# Patient Record
Sex: MALE | Race: WHITE
[De-identification: names, ages, dates, MRNs, and addresses within clinical notes are randomized per-mention and may not be internally consistent; named-entity substitution may affect disease eponyms.]

---

## 2018-02-21 ENCOUNTER — HOSPITAL ENCOUNTER (OUTPATIENT)
Dept: HOSPITAL 17 - HDIC | Age: 58
Setting detail: OBSERVATION
LOS: 1 days | Discharge: HOME | End: 2018-02-22
Attending: INTERNAL MEDICINE | Admitting: INTERNAL MEDICINE
Payer: COMMERCIAL

## 2018-02-21 VITALS
RESPIRATION RATE: 20 BRPM | HEART RATE: 87 BPM | OXYGEN SATURATION: 97 % | SYSTOLIC BLOOD PRESSURE: 91 MMHG | DIASTOLIC BLOOD PRESSURE: 56 MMHG

## 2018-02-21 VITALS
OXYGEN SATURATION: 97 % | DIASTOLIC BLOOD PRESSURE: 68 MMHG | SYSTOLIC BLOOD PRESSURE: 95 MMHG | HEART RATE: 91 BPM | RESPIRATION RATE: 18 BRPM

## 2018-02-21 VITALS
HEART RATE: 89 BPM | RESPIRATION RATE: 20 BRPM | TEMPERATURE: 98 F | DIASTOLIC BLOOD PRESSURE: 63 MMHG | SYSTOLIC BLOOD PRESSURE: 96 MMHG | OXYGEN SATURATION: 99 %

## 2018-02-21 VITALS — WEIGHT: 86.42 LBS | BODY MASS INDEX: 15.9 KG/M2 | HEIGHT: 62 IN

## 2018-02-21 VITALS
SYSTOLIC BLOOD PRESSURE: 96 MMHG | RESPIRATION RATE: 20 BRPM | OXYGEN SATURATION: 98 % | TEMPERATURE: 97.6 F | DIASTOLIC BLOOD PRESSURE: 63 MMHG | HEART RATE: 89 BPM

## 2018-02-21 VITALS
HEART RATE: 83 BPM | SYSTOLIC BLOOD PRESSURE: 103 MMHG | OXYGEN SATURATION: 100 % | DIASTOLIC BLOOD PRESSURE: 73 MMHG | RESPIRATION RATE: 18 BRPM | TEMPERATURE: 97.6 F

## 2018-02-21 VITALS
RESPIRATION RATE: 18 BRPM | DIASTOLIC BLOOD PRESSURE: 60 MMHG | OXYGEN SATURATION: 99 % | SYSTOLIC BLOOD PRESSURE: 99 MMHG | HEART RATE: 95 BPM

## 2018-02-21 VITALS
TEMPERATURE: 98 F | RESPIRATION RATE: 18 BRPM | HEART RATE: 90 BPM | SYSTOLIC BLOOD PRESSURE: 91 MMHG | OXYGEN SATURATION: 99 % | DIASTOLIC BLOOD PRESSURE: 60 MMHG

## 2018-02-21 VITALS
OXYGEN SATURATION: 98 % | DIASTOLIC BLOOD PRESSURE: 74 MMHG | HEART RATE: 90 BPM | SYSTOLIC BLOOD PRESSURE: 104 MMHG | RESPIRATION RATE: 18 BRPM

## 2018-02-21 VITALS — HEART RATE: 102 BPM

## 2018-02-21 VITALS — HEART RATE: 96 BPM

## 2018-02-21 VITALS — HEART RATE: 98 BPM

## 2018-02-21 VITALS
DIASTOLIC BLOOD PRESSURE: 71 MMHG | SYSTOLIC BLOOD PRESSURE: 108 MMHG | HEART RATE: 90 BPM | OXYGEN SATURATION: 99 % | RESPIRATION RATE: 20 BRPM

## 2018-02-21 VITALS — HEART RATE: 100 BPM

## 2018-02-21 VITALS — HEART RATE: 93 BPM

## 2018-02-21 DIAGNOSIS — I25.5: ICD-10-CM

## 2018-02-21 DIAGNOSIS — I25.119: Primary | ICD-10-CM

## 2018-02-21 DIAGNOSIS — R94.31: ICD-10-CM

## 2018-02-21 DIAGNOSIS — I50.9: ICD-10-CM

## 2018-02-21 DIAGNOSIS — E11.3299: ICD-10-CM

## 2018-02-21 DIAGNOSIS — E78.5: ICD-10-CM

## 2018-02-21 PROCEDURE — 99153 MOD SED SAME PHYS/QHP EA: CPT

## 2018-02-21 PROCEDURE — 99152 MOD SED SAME PHYS/QHP 5/>YRS: CPT

## 2018-02-21 PROCEDURE — 93454 CORONARY ARTERY ANGIO S&I: CPT

## 2018-02-21 PROCEDURE — 96372 THER/PROPH/DIAG INJ SC/IM: CPT

## 2018-02-21 PROCEDURE — C1876 STENT, NON-COA/NON-COV W/DEL: HCPCS

## 2018-02-21 PROCEDURE — 80048 BASIC METABOLIC PNL TOTAL CA: CPT

## 2018-02-21 PROCEDURE — C1769 GUIDE WIRE: HCPCS

## 2018-02-21 PROCEDURE — 85025 COMPLETE CBC W/AUTO DIFF WBC: CPT

## 2018-02-21 PROCEDURE — C1725 CATH, TRANSLUMIN NON-LASER: HCPCS

## 2018-02-21 PROCEDURE — G0378 HOSPITAL OBSERVATION PER HR: HCPCS

## 2018-02-21 PROCEDURE — 85002 BLEEDING TIME TEST: CPT

## 2018-02-21 PROCEDURE — 80061 LIPID PANEL: CPT

## 2018-02-21 PROCEDURE — 82948 REAGENT STRIP/BLOOD GLUCOSE: CPT

## 2018-02-21 PROCEDURE — C1887 CATHETER, GUIDING: HCPCS

## 2018-02-21 PROCEDURE — 92920 PRQ TRLUML C ANGIOP 1ART&/BR: CPT

## 2018-02-21 PROCEDURE — C1893 INTRO/SHEATH, FIXED,NON-PEEL: HCPCS

## 2018-02-21 PROCEDURE — 93005 ELECTROCARDIOGRAM TRACING: CPT

## 2018-02-21 RX ADMIN — INSULIN ASPART SCH: 100 INJECTION, SOLUTION INTRAVENOUS; SUBCUTANEOUS at 21:00

## 2018-02-21 NOTE — CATHPROC
[employer] HIS Report

Study Information

Study Number    Admission           Scheduled Start             Study Start

 

[id number]    Feb 21 2018 8:37AM      02/21/2018 Feb 21 2018 10:45AM

 

Universal Service

 

Cardiac Catheterization

 

Admit Source               Facility Department

 

Other                   [facility] - Cath Lab

 

Physician and Clinical Staff

Initial MD Martin, Dane          Circulator     Donna Scott,RN

 

                         Recorder      Yinka RN, Elisa Mora,RT(R)

 

Procedures Performed

Procedure                     Location (Site)            Vessel Name

 

Coronary Angiograms                 LCA                 Left Coronary

Coronary Angiograms                 RCA                 Right Coronary

L Heart Cath

PTCA                       LAD Prox                Left Coronary

PTCA ADD ON'S

Stent                       LAD Prox                Left Coronary

Wire insertion                   Fem Art (right)            Femoral Art

Equipment

Time            Description           Size      Mfg Part Number  Used/Scraped

                                           [id number]

11:19    [employer]   WIRE, ASAHI PROWATER 180CM   180CM               Used

                                           *[id number]

                                           [id number]

11:19    [employer]   WIRE, ASAHI PROWATER 180CM   180CM               Used

                                           *[id number]

                   TRANSDUCER, TRUWAVE               [id number]

11:05    [employer]                      *                 Used

                   W/STOCKCOCK                   *[id number]

                                           [phone]



                                           *[id number]

                                           534-621T



                                           *[id number]

                                           [phone]



                                           *[id number]

                                           [id number]

11:05    [employer]    PACK, CCL CUSTOM        *                 Used

                                           *[id number]

                                           [id number]

11:05    [employer]      PEN, SKIN DUAL W/ RULER     *                 Used

                                           *[id number]

                                           [id number]

11:31    [employer]        BALLOON, 2.0 X 12MM EUPHORA 12MM                 Used

                                           *[id number]

                                           [id number]

11:35    [employer]        STENT, 2.25 18 INTEGRITY    2.25 18              Used

                                           *[id number]

                                           [id number]

11:22    [employer]      30 IBRAHIMA INDEFLATOR                         Used

                                           *[id number]

                                           [id number]

11:05    [employer]      SHEATH, FR6.5 PRELUDE 11CM   FR 6.5     038ACT      Used

                                           *[id number]

                                           [id number]

11:05    [employer]      WIRE, 3MMJ .035 180CM      180CM               Used

                                           *[id number]

                                           [id number]

11:05    [employer]          MANIFOLD, 4 PORT        *                 Used

                                           *[id number]

11:05    [employer]         OMNIPAQUE, 350 MG, 100ML    100ML      [id number]      Used

 

11:22    [employer]         OMNIPAQUE, 350 MG, 50ML     50ML      [id number]      Used

                                           [id number]

11:05    [employer]      BLANKET,WARM AIR CCL      *                 Used

                                           *[id number]

 

Equipment Model, Serial, Lot Number and Expiration Data

Description              Model Number      Serial Number   Lot Number       Expiration Date

 

STENT, 2.25 18 INTEGRITY                            [id number]       05-

 

 

History: Current Medications

Medication         Dosage/Unit       Route      Frequency  Last Date/Time Taken

 

CARVEDILOL

 

ASA

 

Statins (any)

 

 

History: Allergies

Allergy              Reaction

 

NKDA

History: Risk Factors

                      Family History of

Hypertension   Dyslipidemia                    Previous MI    Previous Heart Failure

                      Premature CAD

No        Yes           No            No         Yes

Prior Valve

         Prior PCI        Prior CABG

Surgery

No        No            No

         Cerebrovascular     Peripheral Artery     Chronic Lung

On Dialysis                                       Diabetes   Diabetes Therapy

         Disease         Disease          Disease

No        No            No            No         Yes      Oral

 

 

History: Risk Factors Selection Items

No Previous Cardiovascular Treatment

 

 

History: Stress Tests

Stress or Imaging Studies Performed

 

No

 

 

History: Other

Current Smoker

 

No

 

Labs

Hgb (g/dl)       Hct (%)         WBC (l/cumm)        Platelets (thousands)

 

11.60-17.00      35.00-51.00       4.00-11.00         150..00

 

10.0          28.8           6.9             357

 

Glucose (mg/dl)    BUN (mg/dl)       Creatinine (mg/dl)    BUN:Creatinine (1:x)

74..00      7.00-18.00        0.50-1.30         10.00-20.00

 

254          27            1.4            19.3

 

Na (meq/l)       K (meq/l)

 

136..00     3.50-5.10

 

135          4.8

 

CPK-MB (ng/ML)

 

0.50-3.60

 

Not Drawn

 

 

 

 

Medication

Medication Total Dose (Bolus/Oral)

Medication             Total Dosage/Unit

 

1% XYLOCAINE               20 mL

 

HEPARIN                3000 units

 

PLAVIX                 600 mg

 

VERSED                  1 mg

Medications (Bolus/Oral)

Medication           Time Given          Dosage/Unit      Administered By      Reason

 

1% XYLOCAINE          2/21/2018 11:07:34 AM     20 mL         Dane Martin

20 mL 1% XYLOCAINE given by Dane Martin in Right Groin via Subcutaneous.

 

VERSED             2/21/2018 11:08:05 AM     1 mg         Donna Scott      For sedation

1 mg VERSED given in lab by Donna Scott, RN via Peripheral IV. Ordered by Dane Martin. Reason
: For sedation.

 

HEPARIN             2/21/2018 11:17:25 AM    3000 units       Donna Scott

3000 units HEPARIN given in lab by Donna Scott, RN in Left Antecubital via Peripheral IV. Ordered
 by Dane Martin.

 

PLAVIX             2/21/2018 11:48:11 AM    600 mg         Donna Scott

600 mg PLAVIX given in lab by Donna Scott RN via Oral. Ordered by Dane Martin.

 

 

Initial Case Assessment

Cardiovascular

HR             NIBP            Chest Pain

 

89             104/67           0

 

Edema Present        Skin color             Skin

 

None            Normal               Warm Dry

 

Circulatory - Right Pulses

Dorsalis Pedis       Femoral

 

2              2

 

Scale (0,1,2,3,4,d)

 

Circulatory - Left Pulses

Dorsalis Pedis       Femoral

 

2              2

 

Scale (0,1,2,3,4,d)

 

Circulatory - Lower Extremities

Color Lower Right      Color Lower Left

 

 

Normal           Normal

 

Neurological State

              Oriented to time-place-

Alert                        Moves all extremities

              person

 

Respiration - General

Respiration Rate

              SpO2 (%)          O2 (lpm)

(B/min)

15             100             0

Final Case Assessment

Cardiovascular

HR           Rhythm          NIBP          Chest Pain

 

83           sr            118/70         0

 

Edema Present      Skin color           Skin

 

None           Normal             Warm Dry

 

Circulatory - Right Pulses

Dorsalis Pedis     Femoral

 

2            2

 

Scale (0,1,2,3,4,d)

 

Circulatory - Left Pulses

Dorsalis Pedis     Femoral

 

2            2

 

Scale (0,1,2,3,4,d)

 

Circulatory - Lower Extremities

Color Lower Right    Color Lower Left

 

 

Normal         Normal

 

Neurological State

Lethargic

 

Respiration - General

Respiration Rate

            SpO2 (%)

(B/min)

10           100

 

Chronological Log

Time    Study Chronological Log

 

10:46:03  Patient arrived via Bed.

 

10:47:37  Patient Name, D.O.B, / Armband Verified By R.N.

 

10:47:41  Consent signed by the physician and the patient and verified by the Cath Lab staff.

 

10:47:45  Pre-op and post- op instructions given; patient acknowledges understanding of instructions.


 

10:48:24  Presedation assessment performed by Cath Lab RN.

      Vitals capture started with the following parameters, Patient=Adult, Interval=5 min, Initial Pr
reandb=324 mmHg,

10:49:17

      Deflation Rate=5 mmHg, Cuff placed on Left Arm

10:49:53  HR=78 bpm, RYJP=530/67 mmhg, YvG3=946.0 %, Resp=12 B/min, Pain=0, Everardo=10, Corbett=2

 

10:52:08  Patient has been NPO for More than 6Hrs.

 

10:52:22  Skin Breakdown- NONE

 

10:52:34  Patient Warmer Placed on the Table.

 

10:52:40  A # 20 IV was noted in the Antecubital (left). Grade = 0

 

10:53:08  History and physical on the chart or being dictated.

      Assessment: Initial Case, HR=89 BPM, BZVX=677/67 mmhg, Chest Pain=0, Edema=None, Color=Normal, 
Skin =

      Warm, Dry

      Right Pulses: Troy Ped=2, Femoral=2

      Left Pulses: Troy Ped=2, Femoral=2

10:53:10

      Lower Right Extremities: Color=Normal

      Lower Left Extremities: Color=Normal

      Neurological: State=Alert, Ox3, XIE

      Respiration: Resp=15 B/min, TwC5=425 %, O2=0 lpm

10:54:08  Table restraints applied according to hospital policy

 

10:54:11  Right groin prepped with 2% chlorhexidine, and draped after a 3 min. waiting time.

 

10:54:46  HR=77 bpm, ODGY=133/67 mmhg, StK2=020.0 %, Resp=15 B/min, Corbett=2

 

10:54:56  MD arrived.

 

10:59:45  HR=92 bpm, FITJ=484/72 mmhg, CxQ1=022.0 %, Resp=16 B/min, Corbett=2

 

11:04:46  HR=77 bpm, OGNJ=783/71 mmhg, MnZ6=396.0 %, Resp=20 B/min, Corbett=2

      Time Out. Correct patient, correct procedure, correct physician, power injector loaded, or not 
loaded with contrast with

11:06:34

      surgical team present. Time Out Concurred by MD and individual staff in procedure.

11:07:27  Case Start

 

11:07:34  20 mL 1% XYLOCAINE given by Dane Martin in Right Groin via Subcutaneous.

 

11:08:05  1 mg VERSED given in lab by Donna Scott, RN via Peripheral IV. Ordered by Al Martin. Reason: For sedation.

 

11:09:47  HR=81 bpm, KURR=980/71 mmhg, ZlJ6=932.0 %, Resp=15 B/min, Corbett=2

 

11:11:04  Pressure channel 1 zeroed.

 

11:11:55  Access site was Right Femoral Artery.

 

11:12:06  A SHEATH, FR6.5 PRELUDE 11CM FR 6.5 was advanced into the Fem Art (right) using the Percuta
neous technique.

 

11:13:14  ELISA RELIEVED SYLVESTER

      A JL 4.0 INFINITI CATHETER FR 6 was advanced over a wire. OMNIPAQUE, 350 MG, 100ML 100ML was us
ed for

11:13:36

      injections.

      Recorded Pressure: Ao, HR=83, Condition=Condition 1

11:14:14

      (Aorta) Ao 104/62/80

11:14:24  The  LCA was injected and visualized at various angles. OMNIPAQUE, 350 MG, 100ML 100ML used
.

 

11:14:38  Reference ECG taken

 

11:14:50  HR=83 bpm, NIBP=96/58 mmhg, ZvC3=992.0 %, Resp=15 B/min

 

11:16:17  Catheter was removed

      A JR 4.0 INFINITI CATHETER FR 6 was advanced over a wire. OMNIPAQUE, 350 MG, 100ML 100ML was us
ed for

11:16:19

      injections.

11:17:22  The  RCA was injected and visualized at various angles. OMNIPAQUE, 350 MG, 100ML 100ML used
.

      3000 units HEPARIN given in lab by Donna Scott, RN in Left Antecubital via Peripheral IV. O
rdered by Mario,

11:17:25

      Dane.

11:17:46  Catheter was removed

 

11:19:45  HR=80 bpm, HSTQ=167/66 mmhg, LfI6=249.0 %, Resp=15 B/min

      A XBLAD 3.5 GUIDE CATHETER FR 6 was advanced over a wire. OMNIPAQUE, 350 MG, 100ML 100ML was us
ed for

11:20:14

      injections.

11:21:50  A-LINE and bed CALLED FOR

 

11:21:52  OMNIPAQUE, 350 MG, 50ML 50ML and 30 IBRAHIMA INDEFLATOR added.

 

11:22:24  A WIRE, ASAHI PROWATER 180CM 180CM was inserted via Fem Art (right). DOWN THE LAD

 

11:23:35  Interventional wire has crossed the lesion

 

11:23:54  A WIRE, ASAHI PROWATER 180CM 180CM was inserted via Fem Art (right). CAITY WIRE DOWN DIAGON
AL

 

11:24:48  HR=81 bpm, LVNS=097/66 mmhg, YbO6=969.0 %, Resp=14 B/min

11:26:18  Wire removed for reshaping

 

11:27:21  Wire reinserted

 

11:27:42  Activated Clotting Time Drawn

 

11:29:49  HR=84 bpm, OZAS=990/61 mmhg, PwG8=746.0 %, Resp=12 B/min

 

11:31:33  A BALLOON, 2.0 X 12MM EUPHORA 12MM was inserted over WIRE, ASAHI PROWATER 180CM 180CM via t
he LAD Prox.

 

11:32:13  ACT (Normal Range ) = 234

      A BALLOON, 2.0 X 12MM EUPHORA 12MM over a WIRE, ASAHI PROWATER 180CM 180CM in the LAD Prox was 
inflated

11:33:12

      using a 30 IBRAHIMA INDEFLATOR at 12 ibrahima for 20 sec.

11:34:50  HR=80 bpm, JSSD=012/66 mmhg, FuO7=374.0 %, Resp=23 B/min

 

11:34:53  Balloon Removed.

      An STENT, 2.25 18 INTEGRITY 2.25 18 Bare Metal Stent was inserted through a XBLAD 3.5 GUIDE CAT
HETER FR 6

11:36:05

      over a WIRE, ASAHI PROWATER 180CM 180CM.

11:36:35  Wire removed(diagonal wire removed)

      A STENT, 2.25 18 INTEGRITY 2.25 18 was deployed using a 30 IBRAHIMA INDEFLATOR at 10 atmospheres for
 13 seconds in

11:37:27

      the LAD Prox.

11:37:57  Delivery device removed

 

11:38:16  Wire removed

 

11:38:21  Catheter was removed

 

11:38:34  The  LCA was injected and visualized at various angles. OMNIPAQUE, 350 MG, 100ML 100ML used
.

 

11:39:31  In the Fem Art (right) the SHEATH, FR6.5 PRELUDE 11CM FR 6.5 was sutured in place by Dane Pereyra ms.

 

11:39:49  HR=79 bpm, ISBK=450/70 mmhg, BuC4=367.0 %, Resp=11 B/min

 

11:43:06  Case End

 

11:44:12  Vitals capture stopped.

      Assessment: Final Case, HR=83 BPM, Rhythm=sr, ZFNS=363/70 mmhg, Chest Pain=0, Edema=None, Color
=Normal,

      Skin = Warm, Dry

      Right Pulses: Troy Ped=2, Femoral=2

      Left Pulses: Troy Ped=2, Femoral=2

11:44:13

      Lower Right Extremities: Color=Normal

      Lower Left Extremities: Color=Normal

      Neurological: State=Lethargic

      Respiration: Resp=10 B/min, DkR9=416 %

11:46:35  Sterile dressing applied to site

 

11:46:39  No case complications noted.

 

11:47:14  Bedside Report will be given.

 

11:47:15  Implantable Device card placed in patient's chart.

 

11:47:21  A Left Heart Cath was performed.

 

11:48:11  600 mg PLAVIX given in lab by Donna Scott RN via Oral. Ordered by Dane Martin.

 

11:49:28  Patient moved to stretcher

End Study - Contrast Media Used In Study

Contrast      Total Opened (mL)  Total Used (mL)     Total Wasted (mL)

 

Omnipaque     110         110           0

 

End Study - Maximum Contrast Load

Max Contrast Load (mL)

 

151.0

 

End Study - Radiation Exposure

Fluoro Time

(minutes)

7.1

 

 

End Study - Patient Disposition

Complications   Transferred To       Interventional Outcome

 

No         Regular Bed        successful

## 2018-02-21 NOTE — MA
cc:

LANRE GLEZ MD

****

 

 

DATE: 02/21/2018

 

PROCEDURE

The patient was prepped and draped in usual fashion.  A six sheath was inserted

percutaneously into the right femoral artery.

Coronary angiography was done with Kingsley preformed catheters.  Left

ventriculography was not done due to contrast considerations.

 

RESULTS

Left main coronary was normal. Left anterior descending artery demonstrated an

80-90% stenosis in its proximal portion just prior to the takeoff of the first

diagonal branch extending somewhat distal to that takeoff. The takeoff of the

first diagonal was not involved in the stenosis. The remainder of the LAD

appeared to be free from disease.  Left circumflex artery arose from the left

main and was essentially normal throughout its course. The right coronary was

anatomically dominant.  Mild luminal irregularities were present throughout its

course with 20% stenosis noted in the mid and distal portions of the artery.

Following consent heparin was administered with an ACT of 234 seconds. A

Prowater wire was advanced into the distal LAD and a second wire was advanced

into the first diagonal branch.  Balloon dilatation was carried out with 2-0 x

12 mm balloon, no compromise of the diagonal orifice was present.  The diagonal

wire was removed as was the dilating balloon. A 225 x 18 mm bare metal stent

was then deployed to 10 atmospheres with a good cosmetic result.  ADILENE-III flow

was present pre and post  procedure with essentially zero residual stenosis.

 

CONCLUSION

Successful PTCA and stenting of the proximal and mid LAD.

 

 

 

                              _________________________________

                              MD JACQUE Cason/TLL

D:  2/21/2018/11:48 AM

T:  2/21/2018/11:57 AM

Visit #:  [id number]

Job #:  [id number]

## 2018-02-22 VITALS — HEART RATE: 94 BPM

## 2018-02-22 VITALS — HEART RATE: 80 BPM

## 2018-02-22 VITALS — HEART RATE: 90 BPM

## 2018-02-22 VITALS
HEART RATE: 92 BPM | OXYGEN SATURATION: 98 % | RESPIRATION RATE: 20 BRPM | TEMPERATURE: 98.2 F | SYSTOLIC BLOOD PRESSURE: 123 MMHG | DIASTOLIC BLOOD PRESSURE: 80 MMHG

## 2018-02-22 VITALS — HEART RATE: 83 BPM

## 2018-02-22 VITALS — HEART RATE: 86 BPM

## 2018-02-22 VITALS
DIASTOLIC BLOOD PRESSURE: 75 MMHG | TEMPERATURE: 98.3 F | RESPIRATION RATE: 16 BRPM | OXYGEN SATURATION: 98 % | SYSTOLIC BLOOD PRESSURE: 104 MMHG | HEART RATE: 91 BPM

## 2018-02-22 VITALS — HEART RATE: 91 BPM

## 2018-02-22 LAB
BASOPHILS # BLD AUTO: 0 TH/MM3 (ref 0–0.2)
BASOPHILS NFR BLD: 0.5 % (ref 0–2)
BUN SERPL-MCNC: 40 MG/DL (ref 7–18)
CALCIUM SERPL-MCNC: 7.7 MG/DL (ref 8.5–10.1)
CHLORIDE SERPL-SCNC: 95 MEQ/L (ref 98–107)
CHOLEST SERPL-MCNC: 220 MG/DL (ref 120–200)
CHOLESTEROL/ HDL RATIO: 9.01 RATIO
CREAT SERPL-MCNC: 2.02 MG/DL (ref 0.6–1.3)
EOSINOPHIL # BLD: 0.1 TH/MM3 (ref 0–0.4)
EOSINOPHIL NFR BLD: 1.5 % (ref 0–4)
ERYTHROCYTE [DISTWIDTH] IN BLOOD BY AUTOMATED COUNT: 14.4 % (ref 11.6–17.2)
GFR SERPLBLD BASED ON 1.73 SQ M-ARVRAT: 34 ML/MIN (ref 89–?)
GLUCOSE SERPL-MCNC: 305 MG/DL (ref 74–106)
HCO3 BLD-SCNC: 27.4 MEQ/L (ref 21–32)
HCT VFR BLD CALC: 24.4 % (ref 39–51)
HDLC SERPL-MCNC: 24.4 MG/DL (ref 40–60)
HGB BLD-MCNC: 9.1 GM/DL (ref 13–17)
LYMPHOCYTES # BLD AUTO: 0.8 TH/MM3 (ref 1–4.8)
LYMPHOCYTES NFR BLD AUTO: 16.1 % (ref 9–44)
MCH RBC QN AUTO: 30.8 PG (ref 27–34)
MCHC RBC AUTO-ENTMCNC: 37.3 % (ref 32–36)
MCV RBC AUTO: 82.5 FL (ref 80–100)
MONOCYTE #: 0.4 TH/MM3 (ref 0–0.9)
MONOCYTES NFR BLD: 7.8 % (ref 0–8)
NEUTROPHILS # BLD AUTO: 3.7 TH/MM3 (ref 1.8–7.7)
NEUTROPHILS NFR BLD AUTO: 74.1 % (ref 16–70)
PLATELET # BLD: 263 TH/MM3 (ref 150–450)
PMV BLD AUTO: 8.1 FL (ref 7–11)
RBC # BLD AUTO: 2.96 MIL/MM3 (ref 4.5–5.9)
SODIUM SERPL-SCNC: 132 MEQ/L (ref 136–145)
TRIGL SERPL-MCNC: 1346 MG/DL (ref 42–150)
WBC # BLD AUTO: 5 TH/MM3 (ref 4–11)

## 2018-02-22 RX ADMIN — INSULIN ASPART SCH: 100 INJECTION, SOLUTION INTRAVENOUS; SUBCUTANEOUS at 08:47

## 2018-02-22 NOTE — PD.CARD.PN
Subjective


Subjective Remarks


Stable CV. Creatinine 2.02





Objective


Medications





Current Medications








 Medications


  (Trade)  Dose


 Ordered  Sig/Bhavana


 Route  Start Time


 Stop Time Status Last Admin


 


  (Xylocaine 2%


 Jelly)  1 applic  UNSCH  PRN


 TOP  2/21/18 11:45


     


 


 


  (Ativan Inj)  0.5 mg  UNSCH  PRN


 IV PUSH  2/21/18 11:45


 2/22/18 11:44   


 


 


 Sodium Chloride  250 ml @ 


 500 mls/hr  ONCE  PRN


 IV  2/21/18 11:45


 2/22/18 11:44   


 


 


  (Zofran Inj)  4 mg  Q4H  PRN


 IV PUSH  2/21/18 11:45


     


 


 


  (Xylocaine 1%


 Inj (50 ml))  10 ml  UNSCH  PRN


 INFIL  2/21/18 11:45


 2/22/18 11:44   


 


 


  (Tylenol)  325 mg  Q4H  PRN


 PO  2/21/18 11:45


     


 


 


  (Restoril)  15 mg  HS  PRN


 PO  2/21/18 11:45


     


 


 


  (Aspirin Chew)  81 mg  DAILY


 PO  2/21/18 13:00


     


 


 


  (Plavix)  75 mg  DAILY


 PO  2/22/18 09:00


     


 


 


  (Lipitor)  10 mg  HS


 PO  2/21/18 21:00


    2/21/18 22:08


 


 


  (D50w (Vial) Inj)  50 ml  UNSCH  PRN


 IV PUSH  2/21/18 22:15


     


 


 


  (Glucagon Inj)  1 mg  UNSCH  PRN


 OTHER  2/21/18 22:15


     


 


 


  (NovoLOG


 SUPPLEMENTAL


 SCALE)  1  ACHS SLIDING  SCALE


 SQ  2/21/18 21:00


    2/21/18 21:00


 








Vital Signs / I&O





Vital Signs








  Date Time  Temp Pulse Resp B/P (MAP) Pulse Ox O2 Delivery O2 Flow Rate FiO2


 


2/22/18 07:15  81      


 


2/22/18 07:15 98.3 91 16 104/75 (85) 98   


 


2/22/18 06:23  83      


 


2/22/18 05:50  83      


 


2/22/18 04:52  90      


 


2/22/18 03:40 98.2 92 20 123/80 (94) 98   


 


2/22/18 03:40  89      


 


2/22/18 02:21  90      


 


2/22/18 01:35  91      


 


2/22/18 00:46  94      


 


2/21/18 23:00  98      


 


2/21/18 22:00  96      


 


2/21/18 21:00  100      


 


2/21/18 20:00  102      


 


2/21/18 19:30 98.0 89 20 96/63 (74) 99   


 


2/21/18 19:10  93      


 


2/21/18 19:00 97.6 89 20 96/63 (74) 98   


 


2/21/18 18:30  95 18 99/60 (73) 99   


 


2/21/18 18:00  91 18 95/68 (77) 97   


 


2/21/18 17:15  90 20 108/71 (83) 99   


 


2/21/18 16:45  87 20 91/56 (68) 97   


 


2/21/18 16:15  90 18 104/74 (84) 98   


 


2/21/18 15:45 98.0 90 18 91/60 (70) 99   


 


2/21/18 15:45  91      


 


2/21/18 12:00     100 Room Air  


 


2/21/18 09:28 97.6 83 18 103/73 (83) 100   














I/O      


 


 2/21/18 2/21/18 2/21/18 2/22/18 2/22/18 2/22/18





 07:00 15:00 23:00 07:00 15:00 23:00


 


Intake Total    480 ml  


 


Output Total   200 ml 400 ml  


 


Balance   -200 ml 80 ml  


 


      


 


Intake Oral    480 ml  


 


Output Urine Total   200 ml 400 ml  


 


# Voids    2  








Physical Exam


lungs clear


RRR


groin OK


Laboratory





Laboratory Tests








Test


  2/22/18


04:25


 


White Blood Count 5.0 TH/MM3 


 


Red Blood Count 2.96 MIL/MM3 


 


Hemoglobin 9.1 GM/DL 


 


Hematocrit 24.4 % 


 


Mean Corpuscular Volume 82.5 FL 


 


Mean Corpuscular Hemoglobin 30.8 PG 


 


Mean Corpuscular Hemoglobin


Concent 37.3 % 


 


 


Red Cell Distribution Width 14.4 % 


 


Platelet Count 263 TH/MM3 


 


Mean Platelet Volume 8.1 FL 


 


Neutrophils (%) (Auto) 74.1 % 


 


Lymphocytes (%) (Auto) 16.1 % 


 


Monocytes (%) (Auto) 7.8 % 


 


Eosinophils (%) (Auto) 1.5 % 


 


Basophils (%) (Auto) 0.5 % 


 


Neutrophils # (Auto) 3.7 TH/MM3 


 


Lymphocytes # (Auto) 0.8 TH/MM3 


 


Monocytes # (Auto) 0.4 TH/MM3 


 


Eosinophils # (Auto) 0.1 TH/MM3 


 


Basophils # (Auto) 0.0 TH/MM3 


 


CBC Comment AUTO DIFF 


 


Blood Urea Nitrogen 40 MG/DL 


 


Creatinine 2.02 MG/DL 


 


Random Glucose 305 MG/DL 


 


Calcium Level 7.7 MG/DL 


 


Sodium Level 132 MEQ/L 


 


Potassium Level 3.4 MEQ/L 


 


Chloride Level 95 MEQ/L 


 


Carbon Dioxide Level 27.4 MEQ/L 


 


Anion Gap 10 MEQ/L 


 


Estimat Glomerular Filtration


Rate 34 ML/MIN 


 


 


Triglycerides Level 1346 MG/DL 


 


Cholesterol Level 220 MG/DL 


 


LDL Cholesterol  MG/DL 


 


HDL Cholesterol 24.4 MG/DL 


 


Cholesterol/HDL Ratio 9.01 RATIO 











Assessment and Plan


Assessment and Plan


OK to D/C continue home meds. Add plavix and continue ASA. Recheck creatinine 

tomorrow











Dane Martin MD Feb 22, 2018 08:35

## 2018-02-22 NOTE — EKG
Date Performed: 02/22/2018       Time Performed: 05:44:56

 

PTAGE:      57 years

 

EKG:      Sinus rhythm 

 

 Leftward axis Cannot rule out anterior infarct - age undetermined Left ventricular hypertrophy Later
al ST-T changes may be due to hypertrophy and/or ischemia Abnormal ECG 

 

NO PREVIOUS TRACING            

 

DOCTOR:   Dayanna Egan  Interpretating Date/Time  02/22/2018 19:37:00